# Patient Record
Sex: MALE | Race: BLACK OR AFRICAN AMERICAN | NOT HISPANIC OR LATINO | Employment: OTHER | URBAN - METROPOLITAN AREA
[De-identification: names, ages, dates, MRNs, and addresses within clinical notes are randomized per-mention and may not be internally consistent; named-entity substitution may affect disease eponyms.]

---

## 2023-04-17 PROBLEM — M48.02 CERVICAL STENOSIS OF SPINE: Status: ACTIVE | Noted: 2023-04-17

## 2023-04-17 PROBLEM — M06.9 RHEUMATOID ARTHRITIS: Status: ACTIVE | Noted: 2023-04-17

## 2023-04-17 PROBLEM — I10 HYPERTENSION: Status: ACTIVE | Noted: 2023-04-17

## 2023-04-17 PROBLEM — E11.9 TYPE 2 DIABETES MELLITUS WITHOUT COMPLICATION: Status: ACTIVE | Noted: 2022-07-29

## 2023-04-17 PROBLEM — E78.5 HYPERLIPIDEMIA: Status: ACTIVE | Noted: 2023-04-17

## 2023-10-15 PROBLEM — M48.04 THORACIC STENOSIS: Status: ACTIVE | Noted: 2023-10-15

## 2023-10-16 PROBLEM — I82.409 DVT (DEEP VENOUS THROMBOSIS): Status: ACTIVE | Noted: 2023-10-16

## 2023-10-24 PROBLEM — M48.061 SPINAL STENOSIS OF LUMBAR REGION: Status: ACTIVE | Noted: 2023-10-24

## 2023-10-31 ENCOUNTER — PATIENT MESSAGE (OUTPATIENT)
Dept: ADMINISTRATIVE | Facility: CLINIC | Age: 68
End: 2023-10-31

## 2023-10-31 ENCOUNTER — PATIENT OUTREACH (OUTPATIENT)
Dept: ADMINISTRATIVE | Facility: CLINIC | Age: 68
End: 2023-10-31

## 2023-10-31 NOTE — PROGRESS NOTES
C3 nurse attempted to contact George Chavarria for a TCC post hospital discharge follow up call. No answer. Left voicemail with callback information. The patient has a scheduled Post-op appointment with Roberto Ashraf MD (neurosurgery) 11/28/23 @ 12:00pm. Patient does not have a PCP to schedule HOSFU with.

## 2023-11-01 NOTE — PROGRESS NOTES
3rd attempt=C3 nurse attempted to contact George Chavarria for a TCC post hospital discharge follow up call. No answer. Left voicemail with callback information, and OOC#. The patient has a scheduled Post-op appointment with Roberto Ashraf MD (neurosurgery) 11/28/23 @ 12:00pm. Patient does not have a PCP to schedule HOSFU with.

## 2023-11-06 NOTE — PROGRESS NOTES
Received TCM message from patient, returning my call. C3 nurse attempted to contact George RITCHIE Chavarria for a TCC post hospital discharge follow up call. No answer. Left voicemail with callback information. The patient has a scheduled Post-op appointment with Roberto Ashraf MD (neurosurgery) 11/28/23 @ 12:00pm. Patient does not have a PCP to schedule HOSFU with.